# Patient Record
Sex: MALE | Race: WHITE | NOT HISPANIC OR LATINO
[De-identification: names, ages, dates, MRNs, and addresses within clinical notes are randomized per-mention and may not be internally consistent; named-entity substitution may affect disease eponyms.]

---

## 2017-01-08 ENCOUNTER — RX RENEWAL (OUTPATIENT)
Age: 57
End: 2017-01-08

## 2017-07-21 ENCOUNTER — RX RENEWAL (OUTPATIENT)
Age: 57
End: 2017-07-21

## 2020-10-08 ENCOUNTER — APPOINTMENT (OUTPATIENT)
Dept: HEART AND VASCULAR | Facility: CLINIC | Age: 60
End: 2020-10-08
Payer: COMMERCIAL

## 2020-10-08 ENCOUNTER — NON-APPOINTMENT (OUTPATIENT)
Age: 60
End: 2020-10-08

## 2020-10-08 VITALS — DIASTOLIC BLOOD PRESSURE: 86 MMHG | SYSTOLIC BLOOD PRESSURE: 136 MMHG

## 2020-10-08 VITALS
HEART RATE: 60 BPM | SYSTOLIC BLOOD PRESSURE: 132 MMHG | WEIGHT: 144 LBS | HEIGHT: 64 IN | BODY MASS INDEX: 24.59 KG/M2 | TEMPERATURE: 97.9 F | DIASTOLIC BLOOD PRESSURE: 90 MMHG

## 2020-10-08 DIAGNOSIS — I25.10 ATHEROSCLEROTIC HEART DISEASE OF NATIVE CORONARY ARTERY W/OUT ANGINA PECTORIS: ICD-10-CM

## 2020-10-08 DIAGNOSIS — R07.9 CHEST PAIN, UNSPECIFIED: ICD-10-CM

## 2020-10-08 PROCEDURE — 36415 COLL VENOUS BLD VENIPUNCTURE: CPT

## 2020-10-08 PROCEDURE — 82043 UR ALBUMIN QUANTITATIVE: CPT | Mod: QW

## 2020-10-08 PROCEDURE — 81005 URINALYSIS: CPT

## 2020-10-08 PROCEDURE — 93000 ELECTROCARDIOGRAM COMPLETE: CPT

## 2020-10-08 PROCEDURE — 99205 OFFICE O/P NEW HI 60 MIN: CPT | Mod: 25

## 2020-10-08 NOTE — HISTORY OF PRESENT ILLNESS
[FreeTextEntry1] : \par \par 61 y/o male with h/o htn, asthma, family h/o early cvd who presents for initial evaluation today\par \par \par Notes occasional chest heaviness discomfort/sob - on/off - last 6 months ago\par no sob\par no syncope, lh, edema, orthopnea, pnd, palpitations\par \par Has pulmonologist - Dr. Grover\par \par \par runs/walks 4-5 miles several times/week\par lost 25 lbs in last few months\par eating healthy\par stress level high\par \par Last cardiologist - Dr. Carlton Deleon - cardiologist/pcp for 10 yrs, last seen 2 years ago\par last imaging/holter/stress 5 yrs ago\par \par \par HTN diagnosed  in 40's\par had been on losartan in past - now on amlodipine and hctz \par \par had abnormal ekg since 30's\par \par PMH/PSH:\par htn\par allergic rhinitis\par anal fissure/fistula\par hemorrhoids\par s/p hernia repair\par s/p anal fissurectomy/fistulotomy \par asthma\par \par \par SH:\par \par live alone\par no tobacco/drugs\par social etoh\par 3 children\par \par from [place]\par \par \par MEDS:\par hctz 12.5 mg qd\par singulair 10 mg qd\par norvasc 5 mg qd\par venotlin prn\par budesonide/formoterol 2 puffs bid\par finasteride 5 mg qd\par allegra 180 mg prn\par \par \par \par ALL:\par avelox\par pcn\par \par \par FH:\par father -   78, cvd/lung disease, ppm, MI/PCI - 40's\par mother - cabg 60's, alive 80\par brother - alive, 55, htn\par sister - alive, 62, htn\par 3 children - healthy\par \par

## 2020-10-08 NOTE — DISCUSSION/SUMMARY
[Patient] : the patient [___ Month(s)] : [unfilled] month(s) [FreeTextEntry1] : \par 61 y/o male with h/o htn, asthma, family h/o early cvd who presents for initial evaluation today \par \par -ordered ekg today - nsr, normal intervals, prior IMI DENAE \par -labs ordered \par -ordered CTA given prior cp/sob\par -ordered Echo\par -consider asa, statin pending CTA \par -htn - on hctz and norvasc\par -f/up with pulm for asthma\par -counseled on cvd risk factors\par -f/up 1 month

## 2020-10-12 LAB
ALBUMIN SERPL ELPH-MCNC: 4.9 G/DL
ALP BLD-CCNC: 55 U/L
ALT SERPL-CCNC: 25 U/L
ANION GAP SERPL CALC-SCNC: 14 MMOL/L
APPEARANCE: CLEAR
AST SERPL-CCNC: 28 U/L
BACTERIA: NEGATIVE
BASOPHILS # BLD AUTO: 0.06 K/UL
BASOPHILS NFR BLD AUTO: 0.9 %
BILIRUB SERPL-MCNC: 0.7 MG/DL
BILIRUBIN URINE: NEGATIVE
BLOOD URINE: NORMAL
BUN SERPL-MCNC: 17 MG/DL
CALCIUM SERPL-MCNC: 10 MG/DL
CHLORIDE SERPL-SCNC: 99 MMOL/L
CHOLEST SERPL-MCNC: 169 MG/DL
CHOLEST/HDLC SERPL: 2.5 RATIO
CO2 SERPL-SCNC: 27 MMOL/L
COLOR: YELLOW
CREAT SERPL-MCNC: 0.97 MG/DL
CREAT SPEC-SCNC: 178 MG/DL
EOSINOPHIL # BLD AUTO: 0.21 K/UL
EOSINOPHIL NFR BLD AUTO: 3.2 %
ESTIMATED AVERAGE GLUCOSE: 100 MG/DL
GLUCOSE QUALITATIVE U: NEGATIVE
GLUCOSE SERPL-MCNC: 82 MG/DL
HBA1C MFR BLD HPLC: 5.1 %
HCT VFR BLD CALC: 48 %
HDLC SERPL-MCNC: 68 MG/DL
HGB BLD-MCNC: 15.7 G/DL
HYALINE CASTS: 1 /LPF
IMM GRANULOCYTES NFR BLD AUTO: 0.3 %
KETONES URINE: NORMAL
LDLC SERPL CALC-MCNC: 91 MG/DL
LEUKOCYTE ESTERASE URINE: NEGATIVE
LYMPHOCYTES # BLD AUTO: 1.91 K/UL
LYMPHOCYTES NFR BLD AUTO: 29.4 %
MAGNESIUM SERPL-MCNC: 2.1 MG/DL
MAN DIFF?: NORMAL
MCHC RBC-ENTMCNC: 31.4 PG
MCHC RBC-ENTMCNC: 32.7 GM/DL
MCV RBC AUTO: 96 FL
MICROALBUMIN 24H UR DL<=1MG/L-MCNC: <1.2 MG/DL
MICROALBUMIN/CREAT 24H UR-RTO: NORMAL MG/G
MICROSCOPIC-UA: NORMAL
MONOCYTES # BLD AUTO: 0.58 K/UL
MONOCYTES NFR BLD AUTO: 8.9 %
NEUTROPHILS # BLD AUTO: 3.71 K/UL
NEUTROPHILS NFR BLD AUTO: 57.3 %
NITRITE URINE: NEGATIVE
PH URINE: 5.5
PLATELET # BLD AUTO: 266 K/UL
POTASSIUM SERPL-SCNC: 4.1 MMOL/L
PROT SERPL-MCNC: 7.3 G/DL
PROTEIN URINE: NEGATIVE
RBC # BLD: 5 M/UL
RBC # FLD: 12.6 %
RED BLOOD CELLS URINE: 1 /HPF
SARS-COV-2 IGG SERPL IA-ACNC: 0.14 INDEX
SARS-COV-2 IGG SERPL QL IA: NEGATIVE
SODIUM SERPL-SCNC: 140 MMOL/L
SPECIFIC GRAVITY URINE: 1.02
SQUAMOUS EPITHELIAL CELLS: 0 /HPF
TRIGL SERPL-MCNC: 49 MG/DL
TSH SERPL-ACNC: 1.04 UIU/ML
UROBILINOGEN URINE: NORMAL
WBC # FLD AUTO: 6.49 K/UL
WHITE BLOOD CELLS URINE: 0 /HPF

## 2020-11-03 ENCOUNTER — APPOINTMENT (OUTPATIENT)
Dept: CT IMAGING | Facility: HOSPITAL | Age: 60
End: 2020-11-03

## 2020-11-03 ENCOUNTER — OUTPATIENT (OUTPATIENT)
Dept: OUTPATIENT SERVICES | Facility: HOSPITAL | Age: 60
LOS: 1 days | End: 2020-11-03
Payer: COMMERCIAL

## 2020-11-03 ENCOUNTER — RESULT REVIEW (OUTPATIENT)
Age: 60
End: 2020-11-03

## 2020-11-03 PROCEDURE — 75574 CT ANGIO HRT W/3D IMAGE: CPT | Mod: 26

## 2020-11-06 DIAGNOSIS — Q21.0 VENTRICULAR SEPTAL DEFECT: ICD-10-CM

## 2020-11-11 ENCOUNTER — FORM ENCOUNTER (OUTPATIENT)
Age: 60
End: 2020-11-11

## 2020-11-12 ENCOUNTER — OUTPATIENT (OUTPATIENT)
Dept: OUTPATIENT SERVICES | Facility: HOSPITAL | Age: 60
LOS: 1 days | End: 2020-11-12
Payer: COMMERCIAL

## 2020-11-12 DIAGNOSIS — R07.9 CHEST PAIN, UNSPECIFIED: ICD-10-CM

## 2020-11-12 PROCEDURE — 93306 TTE W/DOPPLER COMPLETE: CPT | Mod: 26

## 2021-03-01 RX ORDER — ASPIRIN ENTERIC COATED TABLETS 81 MG 81 MG/1
81 TABLET, DELAYED RELEASE ORAL DAILY
Qty: 30 | Refills: 6 | Status: ACTIVE | COMMUNITY
Start: 2020-11-06 | End: 1900-01-01

## 2021-04-11 ENCOUNTER — APPOINTMENT (OUTPATIENT)
Dept: DISASTER EMERGENCY | Facility: OTHER | Age: 61
End: 2021-04-11
Payer: COMMERCIAL

## 2021-04-11 PROCEDURE — 0002A: CPT

## 2021-06-28 ENCOUNTER — APPOINTMENT (OUTPATIENT)
Dept: HEART AND VASCULAR | Facility: CLINIC | Age: 61
End: 2021-06-28
Payer: COMMERCIAL

## 2021-06-28 ENCOUNTER — NON-APPOINTMENT (OUTPATIENT)
Age: 61
End: 2021-06-28

## 2021-06-28 VITALS
DIASTOLIC BLOOD PRESSURE: 80 MMHG | SYSTOLIC BLOOD PRESSURE: 130 MMHG | BODY MASS INDEX: 25.1 KG/M2 | TEMPERATURE: 97.6 F | HEIGHT: 64 IN | HEART RATE: 63 BPM | WEIGHT: 147 LBS

## 2021-06-28 PROCEDURE — 99214 OFFICE O/P EST MOD 30 MIN: CPT | Mod: 25

## 2021-06-28 PROCEDURE — 36415 COLL VENOUS BLD VENIPUNCTURE: CPT

## 2021-06-28 PROCEDURE — 99072 ADDL SUPL MATRL&STAF TM PHE: CPT

## 2021-06-28 PROCEDURE — 93000 ELECTROCARDIOGRAM COMPLETE: CPT

## 2021-06-28 NOTE — HISTORY OF PRESENT ILLNESS
[FreeTextEntry1] : 60 y/o male with h/o htn, asthma, family h/o early cvd, cad, dilated ao root, sinus of valsalva/right coronary cusp aneurysm who presents for f/up today\par \par \par After last visit sent for CTA and Echo, started on asa, lipitor\par notes he stopped hctz 4 months ago\par uses viagra\par sent to Dr. 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 Grover\par \par \par runs/walks 4-5 miles several times/week\par eating healthy\par stress level high\par \par Last cardiologist - Dr. Carlton Deleon - cardiologist/pcp for 10 yrs, last seen 2 years ago\par last imaging/holter/stress 5 yrs ago\par \par \par HTN diagnosed in 40's\par had been on losartan in past - now on amlodipine and hctz \par \par had abnormal ekg since 30's\par \par PMH/PSH:\par htn\par allergic rhinitis\par anal fissure/fistula\par hemorrhoids\par s/p hernia repair\par s/p anal fissurectomy/fistulotomy \par asthma\par cad\par dilated ao root\par sinus of valsalva/right coronary cusp aneurysm\par \par SH:\par \par live alone\par has girlfriend\par no tobacco/drugs\par social etoh\par 3 children\par \par from [place]\par \par \par MEDS:\par asa 81 mg qd\par lipitor 10 mg qd\par singulair 10 mg qd\par norvasc 5 mg qd\par venotlin prn\par budesonide/formoterol 2 puffs bid\par finasteride 5 mg qd\par allegra 180 mg prn\par viagra prn\par \par \par ALL:\par avelox\par pcn\par \par \par FH:\par father -  78, cvd/lung disease, ppm, MI/PCI - 40's\par mother - cabg 60's, alive 80\par brother - alive, 55, htn\par sister - alive, 62, htn\par 3 children - healthy\par \par

## 2021-06-28 NOTE — DISCUSSION/SUMMARY
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 inferoseptal wall recommend correlation w echo to exclude RV communication, focal aneurysm sinus of valsalva RCC (8mm neck x 4.8 mm length)\par -Echo 11/20: normal lv size/function, ef 55%, no wma, trace mr/tr/pr, ao root dilated 4.10 cm\par -ordered ekg today - nsr, normal intervals, prior IMI DENAE \par -labs 2020 reviewed\par -continue asa, statin\par -htn - on norvasc, start toprol 12.5 mg qd given dilated ao root\par -ordered lipids today\par -f/up with pulm for asthma\par -counseled on cvd risk factors\par -f/up 3 weeks for bp\par \par I have spent 30 minutes reviewing labs, records, tests and discussing bp control/meds, sinus of valsalva aneurysm/dilated ao root evaluation.

## 2021-06-30 ENCOUNTER — APPOINTMENT (OUTPATIENT)
Dept: CARDIOTHORACIC SURGERY | Facility: CLINIC | Age: 61
End: 2021-06-30
Payer: COMMERCIAL

## 2021-06-30 VITALS
HEIGHT: 64 IN | SYSTOLIC BLOOD PRESSURE: 145 MMHG | HEART RATE: 68 BPM | RESPIRATION RATE: 17 BRPM | BODY MASS INDEX: 24.92 KG/M2 | TEMPERATURE: 97.9 F | DIASTOLIC BLOOD PRESSURE: 81 MMHG | OXYGEN SATURATION: 95 % | WEIGHT: 146 LBS

## 2021-06-30 DIAGNOSIS — Z82.49 FAMILY HISTORY OF ISCHEMIC HEART DISEASE AND OTHER DISEASES OF THE CIRCULATORY SYSTEM: ICD-10-CM

## 2021-06-30 LAB
CHOLEST SERPL-MCNC: 121 MG/DL
HDLC SERPL-MCNC: 68 MG/DL
LDLC SERPL CALC-MCNC: 39 MG/DL
NONHDLC SERPL-MCNC: 53 MG/DL
TRIGL SERPL-MCNC: 72 MG/DL

## 2021-06-30 PROCEDURE — 99072 ADDL SUPL MATRL&STAF TM PHE: CPT

## 2021-06-30 PROCEDURE — 99204 OFFICE O/P NEW MOD 45 MIN: CPT

## 2021-07-08 ENCOUNTER — APPOINTMENT (OUTPATIENT)
Dept: CARDIOLOGY | Facility: CLINIC | Age: 61
End: 2021-07-08
Payer: COMMERCIAL

## 2021-07-08 VITALS
HEART RATE: 68 BPM | BODY MASS INDEX: 25.06 KG/M2 | WEIGHT: 146 LBS | SYSTOLIC BLOOD PRESSURE: 120 MMHG | DIASTOLIC BLOOD PRESSURE: 80 MMHG | OXYGEN SATURATION: 96 %

## 2021-07-08 DIAGNOSIS — I10 ESSENTIAL (PRIMARY) HYPERTENSION: ICD-10-CM

## 2021-07-08 DIAGNOSIS — I77.810 THORACIC AORTIC ECTASIA: ICD-10-CM

## 2021-07-08 PROCEDURE — 99072 ADDL SUPL MATRL&STAF TM PHE: CPT

## 2021-07-08 PROCEDURE — 99204 OFFICE O/P NEW MOD 45 MIN: CPT

## 2021-07-08 RX ORDER — KRILL/OM-3/DHA/EPA/PHOSPHO/AST 1000-230MG
81 CAPSULE ORAL DAILY
Qty: 90 | Refills: 3 | Status: DISCONTINUED | COMMUNITY
Start: 2020-11-06 | End: 2021-07-08

## 2021-07-08 RX ORDER — ATORVASTATIN CALCIUM 10 MG/1
10 TABLET, FILM COATED ORAL
Qty: 90 | Refills: 1 | Status: COMPLETED | COMMUNITY
Start: 2020-11-06 | End: 2021-07-08

## 2021-07-08 RX ORDER — HYDROCHLOROTHIAZIDE 12.5 MG/1
12.5 TABLET ORAL
Refills: 0 | Status: DISCONTINUED | COMMUNITY
End: 2021-07-08

## 2021-07-08 NOTE — HISTORY OF PRESENT ILLNESS
[FreeTextEntry1] : In brief, Carlton is a healthy 61-year-old with a history of some mild hypertension and hyperlipidemia.  He has been told in the past of some abnormality of his aorta.  He is asymptomatic with excellent exercise tolerance, no chest pain no shortness of breath.  He has lost weight by effort and feels quite well.  He runs miles every day without any difficulty.  He has some history of mild asthma\par \par He denies chest pain chest pressure back pain or shortness of breath.  He denies palpitations\par \par He recently saw her cardiologist and had both an echocardiogram and CT coronary angiogram.\par \par CT coronary angiogram revealed the following: The heart was normal in size the aortic root measured 4.2 cm there was a 0.8 cm focal outpouching from the right coronary cusp of the sinus of Valsalva distant from the origin of the right coronary artery ascending aorta measured 3.6 cm with distal tapering\par Coronary calcium score was 135 with mild coronary disease with left main less than 20% LAD luminal circumflex mild stenosis and right coronary artery minimal stenosis there was no pericardial effusion\par \par Echocardiogram revealed normal LV and RV function no valvular disease dilated aortic root asending of 4.1 cm\par Lipid panel June 2021 HDL 68 LDL 39 non-HDL 53 triglycerides 72\par \par The patient saw Dr. Nikolay Munson who is evaluating him for possible aortic repair because of the pseudoaneurysm at the right coronary cusp.\par \par He again remains asymptomatic\par

## 2021-07-08 NOTE — ASSESSMENT
[FreeTextEntry1] : Carlton Katz is asymptomatic 61-year-old with some mild hypertension which is well controlled and hyperlipidemia under excellent control.  He has a dilated aortic root of 4.1 cm but does have an outpouching of the sinus of Valsalva  along the right coronary cusp possible pseudoaneurysm.  He is asymptomatic.\par \par We do not have any prior imaging available at this time and he is unaware whether he had a where he had previous CTs of the chest

## 2021-07-08 NOTE — DISCUSSION/SUMMARY
[FreeTextEntry1] : 1.  I will discuss with Dr. Munson his thinking concerning the aortic aneurysm which does not meet criteria for surgery (4.1 cm) and the possible pseudoaneurysm of the right sinus of Valsalva.\par \par 2 presently the patient is asymptomatic with excellent exercise tolerance without evidence of any significant coronary disease on appropriate medications with a well-controlled blood pressure\par \par For now I think we continue continue to manage him medically and perhaps consider further imaging prior to considering more invasive approach

## 2021-07-08 NOTE — REASON FOR VISIT
[FreeTextEntry1] : Carlton Katz 61 years old was seen in cardiac consultation for evaluation of his aortic aneurysm and possible pseudoaneurysm of the sinus of Valsalva.  I had seen him over 7 to 8 years ago but the results of my evaluation are not available to me or to the patient

## 2021-07-08 NOTE — PHYSICAL EXAM
[Well Developed] : well developed [Well Nourished] : well nourished [Normal Conjunctiva] : normal conjunctiva [No Carotid Bruit] : no carotid bruit [Normal S1, S2] : normal S1, S2 [No Murmur] : no murmur [Clear Lung Fields] : clear lung fields [No Respiratory Distress] : no respiratory distress  [Soft] : abdomen soft [Non Tender] : non-tender [Normal Gait] : normal gait [No Edema] : no edema [Normal DP B/L] : normal DP B/L [Moves all extremities] : moves all extremities [de-identified] : P2 normal no diastolic murmur

## 2021-07-12 ENCOUNTER — APPOINTMENT (OUTPATIENT)
Dept: CARDIOTHORACIC SURGERY | Facility: CLINIC | Age: 61
End: 2021-07-12
Payer: COMMERCIAL

## 2021-07-12 DIAGNOSIS — Q25.49 OTHER CONGENITAL MALFORMATIONS OF AORTA: ICD-10-CM

## 2021-07-12 PROCEDURE — 99443: CPT

## 2021-07-13 PROBLEM — Q25.49 SINUS OF VALSALVA ANEURYSM: Status: ACTIVE | Noted: 2020-11-06

## 2021-07-13 NOTE — REASON FOR VISIT
[Follow-Up: _____] : a [unfilled] follow-up visit [Home] : at home, [unfilled] , at the time of the visit. [Medical Office: ([facility])___] : at the medical office located in  [Verbal consent obtained from patient] : the patient, [unfilled]

## 2021-07-16 NOTE — HISTORY OF PRESENT ILLNESS
[FreeTextEntry1] : 61 year old male with a past medical hx of HTN, asthma, CAD, presents for evaluation and management of aortic pathology (aortic root aneurysm, pseudoaneurysm of sinus of valsalva). Patient is under the care of Dr. Ruth Ann Springer. Patient was evaluated in the office on 6/30/21 and returns today for discussion on surgical intervention. \par \par CTA coronaries 11/6/20:\par Aortic root measures 4.2 cm.\par 0.7 cm focal wide based bulge/aneurysm arising from the right coronary cusp of the sinus of Valsalva.\par \par TTE 11/12/20: aortic root 4.1cm. no significant valvular disease. \par \par Patient is doing well and denies recent hospitalization, ER visits, or surgeries. He reports burning sensation to chest s/t asthma which is alleviated by inhalers. He denies fever, chills, fatigue, headache, blurred vision, dizziness, syncope, acute chest pain, palpitations, shortness of breath, orthopnea, paroxysmal nocturnal dyspnea, nausea, vomiting, abdominal pain, back pain, BRBPR or swelling to legs. \par \par **patient has been fully vaccinated against covid -- last dose 4/11/21

## 2021-07-16 NOTE — DATA REVIEWED
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

## 2021-07-16 NOTE — ASSESSMENT
[FreeTextEntry1] : 61 year old male with a past medical hx of HTN, asthma, CAD, presents for evaluation and management of aortic pathology (aortic root aneurysm, pseudoaneurysm of sinus of valsalva).\par \par I have reviewed the patient's medical records, diagnostic images during the time of this office consultation and have made the following recommendation. I have reviewed the indications for surgery, and used our webpage [email or web address] <[email or web address]> to illustrate the aorta and anatomy of the heart. I have discussed the indications for surgery with the patient. \par \par I had a lengthy discussion with the patient regarding his aneurysmal disease and progression. I have recommended that the patient is a candidate for a valve sparing aortic root replacement. \par \par I have discussed the risks, benefits and alternatives to surgery. I have explained the risks of the surgery, including approximately 1% major mortality or morbidity including stroke, infection, bleeding, death, renal failure and heart attack. All questions were addressed and patient agrees to proceed with surgery. \par \par -The patient is a candidate for a valve sparing aortic root replacement. Same day admission 8/2/21. \par -patient will require a cardiac cath prior to surgery. PST - carotid doppler, chest xray, labs, EKG. Cath to be scheduled for 7/26/21. \par -continue current medication regimen.\par

## 2021-07-16 NOTE — END OF VISIT
[Time Spent: ___ minutes] : I have spent [unfilled] minutes of time on the encounter. [FreeTextEntry3] : \par I, STEPHEN SCHILLINGU , am scribing for and in the presence of LORETO RUSH the following sections: History of present illness, past Medical/family/surgical/family/social history, review of systems,  and disposition.\par \par I personally performed the services described in the documentation, reviewed the documentation recorded by the scribe in my presence and it accurately and completely records my words and actions.\par

## 2021-07-20 VITALS
RESPIRATION RATE: 16 BRPM | OXYGEN SATURATION: 99 % | HEIGHT: 66 IN | WEIGHT: 143.08 LBS | HEART RATE: 60 BPM | DIASTOLIC BLOOD PRESSURE: 74 MMHG | TEMPERATURE: 98 F | SYSTOLIC BLOOD PRESSURE: 142 MMHG

## 2021-07-20 RX ORDER — CHLORHEXIDINE GLUCONATE 213 G/1000ML
1 SOLUTION TOPICAL ONCE
Refills: 0 | Status: DISCONTINUED | OUTPATIENT
Start: 2021-07-26 | End: 2021-08-09

## 2021-07-20 RX ORDER — BUDESONIDE AND FORMOTEROL FUMARATE DIHYDRATE 160; 4.5 UG/1; UG/1
160-4.5 AEROSOL RESPIRATORY (INHALATION) TWICE DAILY
Refills: 0 | Status: ACTIVE | COMMUNITY
Start: 2021-07-20

## 2021-07-20 RX ORDER — SILDENAFIL 50 MG/1
50 TABLET ORAL
Refills: 0 | Status: ACTIVE | COMMUNITY
Start: 2021-07-20

## 2021-07-20 NOTE — H&P ADULT - NSHPSOCIALHISTORY_GEN_ALL_CORE
Denies tobacco or illicit drug use  Admits to social ETOH use  Works as   , has girlfriend, lives alone, has 3 children Patient admits to social ETOH use. Patient denies tobacco or illicit drug use.  Patient works as a , , and has a girlfriend. Patient states he lives alone.

## 2021-07-20 NOTE — H&P ADULT - NSHPLABSRESULTS_GEN_ALL_CORE
CBC Full  -  ( 26 Jul 2021 08:06 )  WBC Count : 6.13 K/uL  RBC Count : 5.26 M/uL  Hemoglobin : 16.1 g/dL  Hematocrit : 48.5 %  Platelet Count - Automated : 192 K/uL  Mean Cell Volume : 92.2 fl  Mean Cell Hemoglobin : 30.6 pg  Mean Cell Hemoglobin Concentration : 33.2 gm/dL  Auto Neutrophil # : 3.67 K/uL  Auto Lymphocyte # : 1.55 K/uL  Auto Monocyte # : 0.57 K/uL  Auto Eosinophil # : 0.28 K/uL  Auto Basophil # : 0.04 K/uL  Auto Neutrophil % : 59.8 %  Auto Lymphocyte % : 25.3 %  Auto Monocyte % : 9.3 %  Auto Eosinophil % : 4.6 %  Auto Basophil % : 0.7 %    07-26    141  |  100  |  14  ----------------------------<  87  3.8   |  31  |  0.88    Ca    10.2      26 Jul 2021 08:06    TPro  8.0  /  Alb  5.0  /  TBili  0.6  /  DBili  x   /  AST  26  /  ALT  25  /  AlkPhos  61  07-26

## 2021-07-20 NOTE — H&P ADULT - ASSESSMENT
62 y/o active male w/ strong FHx CAD (father w/ MI/PCIs, mother w/ CABG) and PMHx HTN, HLD, aortic pathology (4.2cm aortic root aneurysm, 0.7cm pseudoaneurysm of sinus of Valsava), BPH, and Asthma who presents to [facility] for cardiac catheterization as part of workup for planned valve sparing aortic root replacement w/ Dr Munson scheduled for 08/02/2021.     EKG on arrival showed sinus bradycardia at _____ w/ evidence of left axis deviation. Labs on arrival within normal limits. Patient reports compliance w/ Aspirin 81 mg daily, last dose taken 07/26/2021 AM prior to arrival to the hospital. Patient was ordered for NS 75 cc/hour x 4 hours.   				  ASA: III		Mallampati class: III	            Anginal Class: Asymptomatic     Sedation Plan: Moderate   Patient Is Suitable Candidate For Sedation: Yes     Risks & benefits of procedure and sedation and risks and benefits for the alternative therapy have been explained to the patient in layman’s terms including but not limited to: allergic reaction, bleeding, infection, arrhythmia, respiratory compromise, renal and vascular compromise, limb damage, MI, CVA, emergent CABG/Vascular Surgery and death. Informed consent obtained and in chart. 60 y/o active male w/ strong FHx CAD (father w/ MI/PCIs, mother w/ CABG) and PMHx HTN, HLD, aortic pathology (4.2cm aortic root aneurysm, 0.7cm pseudoaneurysm of sinus of Valsava), BPH, and Asthma who presents to [facility] for cardiac catheterization as part of workup for planned valve sparing aortic root replacement w/ Dr Munson scheduled for 08/02/2021.     EKG on arrival showed sinus bradycardia at 59 bpm w/ evidence of left axis deviation. Labs on arrival within normal limits. Patient reports compliance w/ Aspirin 81 mg daily, last dose taken 07/26/2021 AM prior to arrival to the hospital. Patient was ordered for NS 75 cc/hour x 4 hours.   				  ASA: III		Mallampati class: III	            Anginal Class: Asymptomatic     Sedation Plan: Moderate   Patient Is Suitable Candidate For Sedation: Yes     Risks & benefits of procedure and sedation and risks and benefits for the alternative therapy have been explained to the patient in layman’s terms including but not limited to: allergic reaction, bleeding, infection, arrhythmia, respiratory compromise, renal and vascular compromise, limb damage, MI, CVA, emergent CABG/Vascular Surgery and death. Informed consent obtained and in chart.

## 2021-07-20 NOTE — H&P ADULT - REASON FOR ADMISSION
Diagnostic Cardiac Catheterization for Valve Sparing Aortic Root Replacement Cardiac Catheterization, Pre-Op

## 2021-07-20 NOTE — H&P ADULT - NSICDXPASTMEDICALHX_GEN_ALL_CORE_FT
PAST MEDICAL HISTORY:  Aortic root aneurysm     Asthma     BPH (benign prostatic hyperplasia)     HLD (hyperlipidemia)     HTN (hypertension)

## 2021-07-20 NOTE — H&P ADULT - HISTORY OF PRESENT ILLNESS
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 Pt states he is normally quite active without CP, SOB, palpitations, dizziness, or syncope, but has noticed a worsening cough with thick, light yellow sputum for the last 2 months which he attributes to asthma and allergies. He states he was seen by his PCP about 1-2 weeks ago and was prescribed a 5 day course of Prednisone and Abx which he states has greatly improved his cough, but has not resolved it. He also denies HA, orthopnea, PND, n/v/d, abdominal pain, melena, BRBPR, urinary symptoms, LE edema, recent travel, sick contacts, or any other symptoms at this time.     CCTA on 11/3/20: aortic root measures 4.2cm, 0.7 cm focal wide based bulge/aneurysm arising from the right coronary cusp of the sinus of Valsalva, deep cleft along the left side of the interventricular septum, Calcium score 135 (75th percentile), non-obstructive CAD, deep myocardial crypt in the mid inferoseptal wall, recommend correlation with echo to exclude RV communication, focal aneurysm of sinus of Valsalva involving the RCC (approximately 8 mm (neck) x 4.8 mm(length)  Echo on 11/12/20: dilated aortic root measuring 4.1cm at the sinuses of Valsalva and EF 55%, no WMA, normal diastolic function, trace MR/TR/OK. Of note, Echo makes no mention of RV communication as was there was concern for this on CCTA.    In light of pt's risk factors and plan for aortic root replacement, pt now presents for diagnostic cardiac cath as part of pre-op workup.   Cardiologist: Dr. Munson, Dr. Springer, Dr. 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 Patient states he is normally quite active w/o chest pain, SOB/EDWARD, palpitations, dizziness, or syncope, but has noticed a worsening cough w/ thick, light yellow sputum for the last 2 months which he attributes to asthma and allergies and recent completed a course of Prednisone and antibiotics. Patient reports that these symptoms have improved at this time. Patient also denies any headache, abdominal pain, nausea/vomiting/diarrhea, melena, LE edema, fever, chills, and known sick contacts. 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 Echocardiogram (11/12/2020) showed dilated aortic root measuring 4.1 cm at the sinuses of Valsalva and EF 55%, no wall motion abnormalities, normal diastolic function, trace MR/TR/GA, and no mention of RV communication (as was there was concern for this on CCTA).    In light of patient's risk factors and plan for aortic root replacement, patient now presents for cardiac catheterization w/ possible intervention if clinically indicated as part of pre-op workup.

## 2021-07-26 ENCOUNTER — APPOINTMENT (OUTPATIENT)
Dept: HEART AND VASCULAR | Facility: CLINIC | Age: 61
End: 2021-07-26

## 2021-07-26 ENCOUNTER — OUTPATIENT (OUTPATIENT)
Dept: OUTPATIENT SERVICES | Facility: HOSPITAL | Age: 61
LOS: 1 days | Discharge: HOME | End: 2021-07-26
Payer: COMMERCIAL

## 2021-07-26 DIAGNOSIS — Z98.890 OTHER SPECIFIED POSTPROCEDURAL STATES: Chronic | ICD-10-CM

## 2021-07-26 LAB
A1C WITH ESTIMATED AVERAGE GLUCOSE RESULT: 5.3 % — SIGNIFICANT CHANGE UP (ref 4–5.6)
ALBUMIN SERPL ELPH-MCNC: 5 G/DL — SIGNIFICANT CHANGE UP (ref 3.3–5)
ALP SERPL-CCNC: 61 U/L — SIGNIFICANT CHANGE UP (ref 40–120)
ALT FLD-CCNC: 25 U/L — SIGNIFICANT CHANGE UP (ref 10–45)
ANION GAP SERPL CALC-SCNC: 10 MMOL/L — SIGNIFICANT CHANGE UP (ref 5–17)
APPEARANCE UR: CLEAR — SIGNIFICANT CHANGE UP
APTT BLD: 33.3 SEC — SIGNIFICANT CHANGE UP (ref 27.5–35.5)
AST SERPL-CCNC: 26 U/L — SIGNIFICANT CHANGE UP (ref 10–40)
BACTERIA # UR AUTO: SIGNIFICANT CHANGE UP /HPF
BASOPHILS # BLD AUTO: 0.04 K/UL — SIGNIFICANT CHANGE UP (ref 0–0.2)
BASOPHILS NFR BLD AUTO: 0.7 % — SIGNIFICANT CHANGE UP (ref 0–2)
BILIRUB SERPL-MCNC: 0.6 MG/DL — SIGNIFICANT CHANGE UP (ref 0.2–1.2)
BILIRUB UR-MCNC: NEGATIVE — SIGNIFICANT CHANGE UP
BLD GP AB SCN SERPL QL: NEGATIVE — SIGNIFICANT CHANGE UP
BUN SERPL-MCNC: 14 MG/DL — SIGNIFICANT CHANGE UP (ref 7–23)
CALCIUM SERPL-MCNC: 10.2 MG/DL — SIGNIFICANT CHANGE UP (ref 8.4–10.5)
CHLORIDE SERPL-SCNC: 100 MMOL/L — SIGNIFICANT CHANGE UP (ref 96–108)
CHOLEST SERPL-MCNC: 133 MG/DL — SIGNIFICANT CHANGE UP
CK MB CFR SERPL CALC: 3.5 NG/ML — SIGNIFICANT CHANGE UP (ref 0–6.7)
CK SERPL-CCNC: 138 U/L — SIGNIFICANT CHANGE UP (ref 30–200)
CO2 SERPL-SCNC: 31 MMOL/L — SIGNIFICANT CHANGE UP (ref 22–31)
COLOR SPEC: YELLOW — SIGNIFICANT CHANGE UP
CREAT SERPL-MCNC: 0.88 MG/DL — SIGNIFICANT CHANGE UP (ref 0.5–1.3)
DIFF PNL FLD: ABNORMAL
EOSINOPHIL # BLD AUTO: 0.28 K/UL — SIGNIFICANT CHANGE UP (ref 0–0.5)
EOSINOPHIL NFR BLD AUTO: 4.6 % — SIGNIFICANT CHANGE UP (ref 0–6)
EPI CELLS # UR: SIGNIFICANT CHANGE UP /HPF (ref 0–5)
ESTIMATED AVERAGE GLUCOSE: 105 MG/DL — SIGNIFICANT CHANGE UP (ref 68–114)
GLUCOSE SERPL-MCNC: 87 MG/DL — SIGNIFICANT CHANGE UP (ref 70–99)
GLUCOSE UR QL: NEGATIVE — SIGNIFICANT CHANGE UP
HBV SURFACE AG SER-ACNC: SIGNIFICANT CHANGE UP
HCT VFR BLD CALC: 48.5 % — SIGNIFICANT CHANGE UP (ref 39–50)
HDLC SERPL-MCNC: 88 MG/DL — SIGNIFICANT CHANGE UP
HGB BLD-MCNC: 16.1 G/DL — SIGNIFICANT CHANGE UP (ref 13–17)
IMM GRANULOCYTES NFR BLD AUTO: 0.3 % — SIGNIFICANT CHANGE UP (ref 0–1.5)
INR BLD: 0.89 — SIGNIFICANT CHANGE UP (ref 0.88–1.16)
KETONES UR-MCNC: NEGATIVE — SIGNIFICANT CHANGE UP
LEUKOCYTE ESTERASE UR-ACNC: NEGATIVE — SIGNIFICANT CHANGE UP
LIPID PNL WITH DIRECT LDL SERPL: 33 MG/DL — SIGNIFICANT CHANGE UP
LYMPHOCYTES # BLD AUTO: 1.55 K/UL — SIGNIFICANT CHANGE UP (ref 1–3.3)
LYMPHOCYTES # BLD AUTO: 25.3 % — SIGNIFICANT CHANGE UP (ref 13–44)
MCHC RBC-ENTMCNC: 30.6 PG — SIGNIFICANT CHANGE UP (ref 27–34)
MCHC RBC-ENTMCNC: 33.2 GM/DL — SIGNIFICANT CHANGE UP (ref 32–36)
MCV RBC AUTO: 92.2 FL — SIGNIFICANT CHANGE UP (ref 80–100)
MONOCYTES # BLD AUTO: 0.57 K/UL — SIGNIFICANT CHANGE UP (ref 0–0.9)
MONOCYTES NFR BLD AUTO: 9.3 % — SIGNIFICANT CHANGE UP (ref 2–14)
NEUTROPHILS # BLD AUTO: 3.67 K/UL — SIGNIFICANT CHANGE UP (ref 1.8–7.4)
NEUTROPHILS NFR BLD AUTO: 59.8 % — SIGNIFICANT CHANGE UP (ref 43–77)
NITRITE UR-MCNC: NEGATIVE — SIGNIFICANT CHANGE UP
NON HDL CHOLESTEROL: 45 MG/DL — SIGNIFICANT CHANGE UP
NRBC # BLD: 0 /100 WBCS — SIGNIFICANT CHANGE UP (ref 0–0)
PH UR: 6.5 — SIGNIFICANT CHANGE UP (ref 5–8)
PLATELET # BLD AUTO: 192 K/UL — SIGNIFICANT CHANGE UP (ref 150–400)
POTASSIUM SERPL-MCNC: 3.8 MMOL/L — SIGNIFICANT CHANGE UP (ref 3.5–5.3)
POTASSIUM SERPL-SCNC: 3.8 MMOL/L — SIGNIFICANT CHANGE UP (ref 3.5–5.3)
PROT SERPL-MCNC: 8 G/DL — SIGNIFICANT CHANGE UP (ref 6–8.3)
PROT UR-MCNC: NEGATIVE MG/DL — SIGNIFICANT CHANGE UP
PROTHROM AB SERPL-ACNC: 10.7 SEC — SIGNIFICANT CHANGE UP (ref 10.6–13.6)
RBC # BLD: 5.26 M/UL — SIGNIFICANT CHANGE UP (ref 4.2–5.8)
RBC # FLD: 12.3 % — SIGNIFICANT CHANGE UP (ref 10.3–14.5)
RBC CASTS # UR COMP ASSIST: < 5 /HPF — SIGNIFICANT CHANGE UP
RH IG SCN BLD-IMP: POSITIVE — SIGNIFICANT CHANGE UP
SODIUM SERPL-SCNC: 141 MMOL/L — SIGNIFICANT CHANGE UP (ref 135–145)
SP GR SPEC: 1.01 — SIGNIFICANT CHANGE UP (ref 1–1.03)
TRIGL SERPL-MCNC: 61 MG/DL — SIGNIFICANT CHANGE UP
TSH SERPL-MCNC: 2.15 UIU/ML — SIGNIFICANT CHANGE UP (ref 0.27–4.2)
UROBILINOGEN FLD QL: 0.2 E.U./DL — SIGNIFICANT CHANGE UP
WBC # BLD: 6.13 K/UL — SIGNIFICANT CHANGE UP (ref 3.8–10.5)
WBC # FLD AUTO: 6.13 K/UL — SIGNIFICANT CHANGE UP (ref 3.8–10.5)
WBC UR QL: < 5 /HPF — SIGNIFICANT CHANGE UP

## 2021-07-26 PROCEDURE — 71045 X-RAY EXAM CHEST 1 VIEW: CPT

## 2021-07-26 PROCEDURE — 84443 ASSAY THYROID STIM HORMONE: CPT

## 2021-07-26 PROCEDURE — 80061 LIPID PANEL: CPT

## 2021-07-26 PROCEDURE — 83036 HEMOGLOBIN GLYCOSYLATED A1C: CPT

## 2021-07-26 PROCEDURE — 93010 ELECTROCARDIOGRAM REPORT: CPT

## 2021-07-26 PROCEDURE — 86850 RBC ANTIBODY SCREEN: CPT

## 2021-07-26 PROCEDURE — 86900 BLOOD TYPING SEROLOGIC ABO: CPT

## 2021-07-26 PROCEDURE — 93458 L HRT ARTERY/VENTRICLE ANGIO: CPT | Mod: 26

## 2021-07-26 PROCEDURE — 71045 X-RAY EXAM CHEST 1 VIEW: CPT | Mod: 26

## 2021-07-26 PROCEDURE — 85610 PROTHROMBIN TIME: CPT

## 2021-07-26 PROCEDURE — 85730 THROMBOPLASTIN TIME PARTIAL: CPT

## 2021-07-26 PROCEDURE — C1887: CPT

## 2021-07-26 PROCEDURE — 93880 EXTRACRANIAL BILAT STUDY: CPT | Mod: 26

## 2021-07-26 PROCEDURE — 93880 EXTRACRANIAL BILAT STUDY: CPT

## 2021-07-26 PROCEDURE — 82550 ASSAY OF CK (CPK): CPT

## 2021-07-26 PROCEDURE — 86901 BLOOD TYPING SEROLOGIC RH(D): CPT

## 2021-07-26 PROCEDURE — 94010 BREATHING CAPACITY TEST: CPT | Mod: 26

## 2021-07-26 PROCEDURE — 93005 ELECTROCARDIOGRAM TRACING: CPT

## 2021-07-26 PROCEDURE — 87340 HEPATITIS B SURFACE AG IA: CPT

## 2021-07-26 PROCEDURE — 80053 COMPREHEN METABOLIC PANEL: CPT

## 2021-07-26 PROCEDURE — C1894: CPT

## 2021-07-26 PROCEDURE — 93458 L HRT ARTERY/VENTRICLE ANGIO: CPT

## 2021-07-26 PROCEDURE — C1769: CPT

## 2021-07-26 PROCEDURE — 94150 VITAL CAPACITY TEST: CPT

## 2021-07-26 PROCEDURE — 81001 URINALYSIS AUTO W/SCOPE: CPT

## 2021-07-26 PROCEDURE — 99213 OFFICE O/P EST LOW 20 MIN: CPT

## 2021-07-26 PROCEDURE — 85025 COMPLETE CBC W/AUTO DIFF WBC: CPT

## 2021-07-26 PROCEDURE — 82553 CREATINE MB FRACTION: CPT

## 2021-07-26 RX ORDER — SODIUM CHLORIDE 9 MG/ML
500 INJECTION INTRAMUSCULAR; INTRAVENOUS; SUBCUTANEOUS
Refills: 0 | Status: DISCONTINUED | OUTPATIENT
Start: 2021-07-26 | End: 2021-07-26

## 2021-07-26 RX ORDER — SODIUM CHLORIDE 9 MG/ML
500 INJECTION INTRAMUSCULAR; INTRAVENOUS; SUBCUTANEOUS
Refills: 0 | Status: DISCONTINUED | OUTPATIENT
Start: 2021-07-26 | End: 2021-08-09

## 2021-07-26 RX ORDER — POTASSIUM CHLORIDE 20 MEQ
30 PACKET (EA) ORAL ONCE
Refills: 0 | Status: COMPLETED | OUTPATIENT
Start: 2021-07-26 | End: 2021-07-26

## 2021-07-26 RX ADMIN — SODIUM CHLORIDE 75 MILLILITER(S): 9 INJECTION INTRAMUSCULAR; INTRAVENOUS; SUBCUTANEOUS at 11:41

## 2021-07-26 RX ADMIN — Medication 30 MILLIEQUIVALENT(S): at 09:07

## 2021-07-26 RX ADMIN — SODIUM CHLORIDE 75 MILLILITER(S): 9 INJECTION INTRAMUSCULAR; INTRAVENOUS; SUBCUTANEOUS at 08:43

## 2021-07-26 NOTE — CONSULT NOTE ADULT - SUBJECTIVE AND OBJECTIVE BOX
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 for cardiac catheterization as part of workup for planned valve sparing aortic root replacement w/ Dr Munson scheduled for 08/02/2021. 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 Patient is now s/p cardiac cath (7/26/21): LM normal, LAD minor irregularities, LCx minor irregularities, RCA normal; LVEF 55%, LVEDp 13mmHg.    Review of systems otherwise negative.     Lifestyle History:  Mediterranean Diet Score (9 question survey) was 5.   (8-9: optimal, 6-7: near-optimal, 4-5: suboptimal, 0-3: markedly suboptimal)  Exercise: Patient reports exercising at a moderate level for >150 minutes per week.   Smoking: Patient denies any history of smoking.   Stress: Patient denies any stress.     PAST MEDICAL & SURGICAL HISTORY:  HTN (hypertension)    HLD (hyperlipidemia)    Aortic root aneurysm    Asthma    BPH (benign prostatic hyperplasia)    H/O hernia repair    S/P anal fissurectomy      FAMILY HISTORY:   CAD (father w/ MI/PCIs; mother w/ CABG)    Allergies:   Avelox (Unknown)  penicillins (Anaphylaxis; Angioedema)      HOME MEDICATIONS:   albuterol 90 mcg/inh inhalation aerosol: 2 puff(s) inhaled , As Needed (26 Jul 2021 08:49)  amLODIPine 5 mg oral tablet: 1 tab(s) orally once a day (26 Jul 2021 08:49)  Aspirin Enteric Coated 81 mg oral delayed release tablet: 1 tab(s) orally once a day (26 Jul 2021 08:49)  atorvastatin 10 mg oral tablet: 1 tab(s) orally once a day (26 Jul 2021 08:49)  budesonide-formoterol 160 mcg-4.5 mcg/inh inhalation aerosol: 2 puff(s) inhaled 2 times a day (26 Jul 2021 08:49)  finasteride 5 mg oral tablet: 1 tab(s) orally once a day (26 Jul 2021 08:49)  metoprolol succinate 25 mg oral tablet, extended release: 0.5 tab(s) orally once a day (26 Jul 2021 08:49)  sildenafil 50 mg oral tablet: 1 tab(s) orally, few times per week as needed (26 Jul 2021 08:49)  Singulair 10 mg oral tablet: 1 tab(s) orally once a day (26 Jul 2021 08:49)      PHYSICAL EXAM:  · Temp (F)	97.5 Degrees F  · Temp (C)	36.4 Degrees C  · Heart Rate	60 /min  · Respiration Rate (breaths/min)	16 /min  · BP Systolic	142 mm Hg  · BP Diastolic	74 mm Hg  · Blood Pressure - Method	electronic  · BP Noninvasive Mean	96 mm Hg  · SpO2 (%)	99 %  · O2 Delivery/Oxygen Delivery Method	room air    Height (cm): 167.6 (07-26 @ 08:59)  Weight (kg): 64.9 (07-26 @ 08:59)  BMI (kg/m2): 23.1 (07-26 @ 08:59)  	  Gen- awake, conversive  Head-NCAT; eyes: no corneal arcus noted b/l; no xanthelasmas   Neck- no JVD, no carotid bruit b/l  Respiratory- respirations non-labored; clear to auscultation b/l   Cardiovascular- S1S2, RRR, no murmur  Neurology- alert and oriented x 3, no focal deficits  Psych- normal affect; appearance, verbalizations, behaviors are appropriate   Skin- no lesions, no rashes, no xanthomas     LABS:	                        16.1   6.13  )-----------( 192      ( 26 Jul 2021 08:06 )             48.5     07-26    141  |  100  |  14  ----------------------------<  87  3.8   |  31  |  0.88    Ca    10.2      26 Jul 2021 08:06    TPro  8.0  /  Alb  5.0  /  TBili  0.6  /  DBili  x   /  AST  26  /  ALT  25  /  AlkPhos  61  07-26      Thyroid Stimulating Hormone, Serum: 2.150 uIU/mL (07-26 @ 08:06)    Cholesterol, Serum: 133 mg/dL (07-26 @ 08:06)  HDL Cholesterol, Serum: 88 mg/dL (07-26 @ 08:06)  Triglycerides, Serum: 61 mg/dL (07-26 @ 08:06)  LDL Cholesterol, Calculated: 33 mg/dL (07-26 @ 08:06)  HbA1c: 5.3% (07-26 @ 08:06)    ASSESSMENT/RECOMMENDATIONS: 	  Patient's dietary, exercise and overall lifestyle habits were reviewed. The concept of atherosclerosis and its systemic nature was discussed with a focus on the need to get all cardiovascular risk factors to goal. At this time, I would like to make the following recommendations to optimize atherosclerotic risk factors.     RECOMMENDATIONS:   Anti-platelet Therapy: APT per interventionalist recommendation.   Lipid Therapy: Patient is currently taking atorvastatin 10mg and is compliant and tolerating it well. Although his current LDL-C is at goal, it would be reasonable to consider increasing the statin dosage for further risk reduction in the future. We would also recommend checking the Lipoprotein A level (Lpa) to assess for a possible genetic component. If elevated, we advise the first degree relatives - siblings and children - also be checked, as it is a strong risk factor for ASCVD.   Hypertension: Blood pressures during this stay were well-controlled.   Mediterranean Diet Score is 5. Some suggestions include continue incorporating 2 or more servings per day of vegetables, fruits, and whole grains. Increase intake of fish and legumes/beans to 2 or more servings per week. Aim to increase intake of healthy fats, such as olive oil and avocados, and have a handful of nuts/seeds most days. Reduce red/processed meat consumption to 2 or fewer times per week.   Exercise: Recommended gradually increasing activity to 30-45 minutes most days of the week once cleared by referring cardiologist. After his aortic surgery, cardiac rehab might benefit this patient and is covered by major insurance plans (other than co-pays), please refer.   Medication Adherence: Patient has no issues with adherence at this time.   Smoking: This patient is a non-smoker.   Obesity/Overweight: The patient is at a healthy weight currently, and his BMI is WNL at 23.1.  Glucometabolic State: Patient's blood sugar is well-controlled at this time. HbA1c today was 5.3%.   Sleep Apnea: The patient is at low risk for sleep apnea.   Psychological Stress: The patient appears to be coping with stressors well at this time.     Thank you for the opportunity to see this patient. Please feel free to contact Prevention if there are any questions, or if you feel that your patient would benefit from continued follow-up visits with the Program.    Jen Sullivan, [facility]  Cardiovascular Prevention     Brenda Harris MD  System Director, Cardiovascular Prevention

## 2021-07-26 NOTE — PROGRESS NOTE ADULT - SUBJECTIVE AND OBJECTIVE BOX
Interventional Cardiology PA SDA Discharge Note    Patient without complaints. 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 for cardiac catheterization as part of workup for planned valve sparing aortic root replacement w/ Dr Munson scheduled for 08/02/2021. Patient states he is normally quite active w/o chest pain, SOB/EDWARD, palpitations, dizziness, or syncope, but has noticed a worsening cough w/ thick, light yellow sputum for the last 2 months which he attributes to asthma and allergies and recent completed a course of Prednisone and antibiotics. Patient reports that these symptoms have improved at this time. Patient also denies any headache, abdominal pain, nausea/vomiting/diarrhea, melena, LE edema, fever, chills, and known sick contacts. 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 Echocardiogram (11/12/2020) showed dilated aortic root measuring 4.1 cm at the sinuses of Valsalva and EF 55%, no wall motion abnormalities, normal diastolic function, trace MR/TR/MO, and no mention of RV communication (as was there was concern for this on CCTA).  In light of patient's risk factors and plan for aortic root replacement, patient now presents for cardiac catheterization w/ possible intervention if clinically indicated as part of pre-op workup.    s/p cardiac cath (7/26/21): LM normal, LAD minor irregularities, LCx minor irregularities, RCA normal; LVEF 55%, LVEDp 13mmHg. R radial TR band due at 12:45pm.     1.	Stable for discharge as per attending Dr. Varela after bed rest, pt voids, groin/wrist stable and 30 minutes of ambulation.  2.	Follow-up with Cardiologist Dr. Springer in 1-2 weeks  3.	Discharged forms signed and copies in chart

## 2021-07-27 PROBLEM — J45.909 UNSPECIFIED ASTHMA, UNCOMPLICATED: Chronic | Status: ACTIVE | Noted: 2021-07-20

## 2021-07-27 PROBLEM — N40.0 BENIGN PROSTATIC HYPERPLASIA WITHOUT LOWER URINARY TRACT SYMPTOMS: Chronic | Status: ACTIVE | Noted: 2021-07-20

## 2021-07-27 PROBLEM — E78.5 HYPERLIPIDEMIA, UNSPECIFIED: Chronic | Status: ACTIVE | Noted: 2021-07-20

## 2021-07-27 PROBLEM — I10 ESSENTIAL (PRIMARY) HYPERTENSION: Chronic | Status: ACTIVE | Noted: 2021-07-20

## 2021-07-27 PROBLEM — I71.9 AORTIC ANEURYSM OF UNSPECIFIED SITE, WITHOUT RUPTURE: Chronic | Status: ACTIVE | Noted: 2021-07-20

## 2021-07-29 DIAGNOSIS — Z82.49 FAMILY HISTORY OF ISCHEMIC HEART DISEASE AND OTHER DISEASES OF THE CIRCULATORY SYSTEM: ICD-10-CM

## 2021-07-29 DIAGNOSIS — I71.9 AORTIC ANEURYSM OF UNSPECIFIED SITE, WITHOUT RUPTURE: ICD-10-CM

## 2021-07-29 DIAGNOSIS — R93.1 ABNORMAL FINDINGS ON DIAGNOSTIC IMAGING OF HEART AND CORONARY CIRCULATION: ICD-10-CM

## 2021-07-29 DIAGNOSIS — Z95.2 PRESENCE OF PROSTHETIC HEART VALVE: ICD-10-CM

## 2021-07-30 ENCOUNTER — NON-APPOINTMENT (OUTPATIENT)
Age: 61
End: 2021-07-30

## 2021-07-30 RX ORDER — ASPIRIN/CALCIUM CARB/MAGNESIUM 324 MG
1 TABLET ORAL
Qty: 0 | Refills: 0 | DISCHARGE

## 2021-07-30 RX ORDER — MONTELUKAST 4 MG/1
1 TABLET, CHEWABLE ORAL
Qty: 0 | Refills: 0 | DISCHARGE

## 2021-07-30 RX ORDER — AMLODIPINE BESYLATE 2.5 MG/1
1 TABLET ORAL
Qty: 0 | Refills: 0 | DISCHARGE

## 2021-07-30 RX ORDER — FINASTERIDE 5 MG/1
1 TABLET, FILM COATED ORAL
Qty: 0 | Refills: 0 | DISCHARGE

## 2021-07-30 RX ORDER — ATORVASTATIN CALCIUM 80 MG/1
1 TABLET, FILM COATED ORAL
Qty: 0 | Refills: 0 | DISCHARGE

## 2021-07-30 RX ORDER — ALBUTEROL 90 UG/1
2 AEROSOL, METERED ORAL
Qty: 0 | Refills: 0 | DISCHARGE

## 2021-07-30 RX ORDER — BUDESONIDE AND FORMOTEROL FUMARATE DIHYDRATE 160; 4.5 UG/1; UG/1
2 AEROSOL RESPIRATORY (INHALATION)
Qty: 0 | Refills: 0 | DISCHARGE

## 2021-07-30 RX ORDER — METOPROLOL TARTRATE 50 MG
0.5 TABLET ORAL
Qty: 0 | Refills: 0 | DISCHARGE

## 2021-08-02 ENCOUNTER — RX RENEWAL (OUTPATIENT)
Age: 61
End: 2021-08-02

## 2021-08-02 ENCOUNTER — APPOINTMENT (OUTPATIENT)
Dept: CARDIOTHORACIC SURGERY | Facility: HOSPITAL | Age: 61
End: 2021-08-02

## 2021-08-12 ENCOUNTER — APPOINTMENT (OUTPATIENT)
Dept: HEART AND VASCULAR | Facility: CLINIC | Age: 61
End: 2021-08-12
Payer: COMMERCIAL

## 2021-08-12 VITALS
TEMPERATURE: 97.9 F | DIASTOLIC BLOOD PRESSURE: 72 MMHG | WEIGHT: 146 LBS | SYSTOLIC BLOOD PRESSURE: 118 MMHG | HEIGHT: 64 IN | HEART RATE: 74 BPM | BODY MASS INDEX: 24.92 KG/M2

## 2021-08-12 DIAGNOSIS — I48.0 PAROXYSMAL ATRIAL FIBRILLATION: ICD-10-CM

## 2021-08-12 PROCEDURE — 93000 ELECTROCARDIOGRAM COMPLETE: CPT

## 2021-08-12 PROCEDURE — 99214 OFFICE O/P EST MOD 30 MIN: CPT | Mod: 25

## 2021-08-12 RX ORDER — METOPROLOL SUCCINATE 25 MG/1
25 TABLET, EXTENDED RELEASE ORAL
Qty: 135 | Refills: 0 | Status: ACTIVE | COMMUNITY
Start: 2021-06-28 | End: 1900-01-01

## 2021-08-12 RX ORDER — AMLODIPINE BESYLATE 5 MG/1
5 TABLET ORAL DAILY
Qty: 30 | Refills: 0 | Status: DISCONTINUED | COMMUNITY
Start: 2021-07-20 | End: 2021-08-12

## 2021-08-12 RX ORDER — WARFARIN SODIUM 6 MG/1
TABLET ORAL
Refills: 0 | Status: ACTIVE | COMMUNITY

## 2021-08-16 NOTE — DISCUSSION/SUMMARY
[Patient] : the patient [___ Month(s)] : in [unfilled] month(s) [FreeTextEntry1] : 60 y/o male with h/o htn, asthma, family h/o early cvd, cad, dilated ao root, sinus of valsalva/right coronary cusp aneurysm who presents for f/up today s/p repair sinus of valsalva aneurysm with woven dacron patch 8/3/21 course c/b post op afib\par \par -ep referral given post op afib, now on coumadin/amio for 8 weeks\par -CTA 11/20: mild aneurysmal dilatation ao root 4.2 cm,.7 cm focal wide based bulge/aneurysm arising from Right coronary cusp of sinus of valsalva, calcium score 135 (75%), LM <25%, prox LAD mild, mid LAD shallow bridge, \par LCx mild, prox/mid RCA minimal, deep myocardial crypt in mid inferoseptal wall recommend correlation w echo to exclude RV communication, focal aneurysm sinus of valsalva RCC (8mm neck x 4.8 mm length)\par -Echo 11/20: normal lv size/function, ef 55%, no wma, trace mr/tr/pr, ao root dilated 4.10 cm\par -ordered ekg today - nsr, rbbb, no st/t changes\par -labs 2020 reviewed\par -continue asa, statin\par -will repeat echo in 3 months\par -will obtain recent labs, d/c summary\par -htn/aortic anerysm, post op afib - on toprol\par -f/up with pulm for asthma\par -Cath 7/21: LAD<30% mid prox stenosis, Cx <30% stenosis, RCA luminal irreg, lv fxn normal ef 55\par %, mod dilated ao root/mild ai\par -counseled on cvd risk factors\par -f/up 3 months post op afib, cad\par \par I have spent 30 minutes reviewing labs, records, tests and discussing bp control/meds, sinus of valsalva aneurysm/dilated ao root repair, post op afib\par

## 2021-08-16 NOTE — HISTORY OF PRESENT ILLNESS
[FreeTextEntry1] : 62 y/o male with h/o htn, asthma, family h/o early cvd, cad, dilated ao root, sinus of valsalva/right coronary cusp aneurysm who presents for f/up today s/p repair sinus of valsalva aneurysm with woven dacron patch 8/3/21\par c/b post op afib\par \par last seen \par \par now s/p repair sinus of valsalva aneurysm with woven dacron patch 8/3/21 at [facility] with Dr. Ledezma\par \par noted post op afib and started amio and coumadin for 8 weeks\par norvasc dc'd and toprol dose increased \par \par no cp, sob, syncope, lh, edema, orthopnea, pnd, palpitations\par notes some left finger numbness/arm pain where prior IV was - states pcp did u/s and no concerns noted of LUE\par \par \par \par Had Cath at [facility]\par Cath : LAD<30% mid prox stenosis, Cx <30% stenosis, RCA luminal irreg, lv fxn normal ef 55\par %, mod dilated ao root/mild ai\par \par \par on asa, lipitor, ccb, bb \par uses viagra\par \par \par -CTA : mild aneurysmal dilatation ao root 4.2 cm, .7 cm focal wide based bulge/aneurysm arising from Right coronary cusp of sinus of valsalva, calcium score 135 (75%), LM <25%, prox LAD mild, mid LAD shallow bridge, \par LCx mild, prox/mid RCA minimal, deep myocardial crypt in mid inferoseptal wall recommend correlation w echo to exclude RV communication, focal aneurysm sinus of valsalva RCC (8mm neck x 4.8 mm length)\par -Echo : normal lv size/function, ef 55%, no wma, trace mr/tr/pr, ao root dilated 4.10 cm\par \par \par \par \par Has pulmonologist - Dr. Grover\par \par \par runs/walks 4-5 miles several times/week\par eating healthy\par stress level high\par \par Last cardiologist - Dr. Carlton Deleon - cardiologist/pcp for 10 yrs, last seen 2 years ago\par last imaging/holter/stress 5 yrs ago\par \par \par HTN diagnosed in 40's\par had been on losartan in past - now on amlodipine and hctz \par \par had abnormal ekg since 30's\par \par PMH/PSH:\par s/p repair sinus of valsalva aneurysm with woven dacron patch 8/3/21\par cad\par htn\par allergic rhinitis\par anal fissure/fistula\par hemorrhoids\par s/p hernia repair\par s/p anal fissurectomy/fistulotomy \par asthma\par dilated ao root\par sinus of valsalva/right coronary cusp aneurysm\par post op afib\par \par SH:\par \par live alone\par has girlfriend\par no tobacco/drugs\par social etoh\par 3 children\par \par from [place]\par \par \par MEDS:\par asa 81 mg qd\par lipitor 10 mg qd\par singulair 10 mg qd\par toprol 37.5 mg qd \par venotlin prn\par budesonide/formoterol 2 puffs bid\par finasteride 5 mg qd\par allegra 180 mg prn\par viagra prn\par coumadin 3 mg qd\par amio 200 mg qd\par neurontin tib\par oxycodone 10 mg prn\par miralax/senna/protonix\par \par \par ALL:\par avelox\par pcn\par \par \par FH:\par father -  78, cvd/lung disease, ppm, MI/PCI - 40's\par mother - cabg 60's, alive 80\par brother - alive, 55, htn\par sister - alive, 62, htn\par 3 children - healthy\par \par \par

## 2021-08-19 ENCOUNTER — APPOINTMENT (OUTPATIENT)
Dept: HEART AND VASCULAR | Facility: CLINIC | Age: 61
End: 2021-08-19
Payer: COMMERCIAL

## 2021-08-19 VITALS
SYSTOLIC BLOOD PRESSURE: 126 MMHG | HEIGHT: 64 IN | BODY MASS INDEX: 26.12 KG/M2 | DIASTOLIC BLOOD PRESSURE: 78 MMHG | HEART RATE: 72 BPM | WEIGHT: 153 LBS

## 2021-08-19 DIAGNOSIS — D64.9 ANEMIA, UNSPECIFIED: ICD-10-CM

## 2021-08-19 PROCEDURE — 99214 OFFICE O/P EST MOD 30 MIN: CPT | Mod: 25

## 2021-08-19 PROCEDURE — 93306 TTE W/DOPPLER COMPLETE: CPT

## 2021-08-19 RX ORDER — AMIODARONE HYDROCHLORIDE 200 MG/1
200 TABLET ORAL
Refills: 0 | Status: DISCONTINUED | COMMUNITY
End: 2021-08-19

## 2021-08-20 ENCOUNTER — APPOINTMENT (OUTPATIENT)
Dept: HEART AND VASCULAR | Facility: CLINIC | Age: 61
End: 2021-08-20
Payer: COMMERCIAL

## 2021-08-20 VITALS — SYSTOLIC BLOOD PRESSURE: 132 MMHG | HEART RATE: 66 BPM | DIASTOLIC BLOOD PRESSURE: 76 MMHG

## 2021-08-20 PROCEDURE — 93306 TTE W/DOPPLER COMPLETE: CPT

## 2021-08-23 LAB
ALBUMIN SERPL ELPH-MCNC: 4.1 G/DL
ALP BLD-CCNC: 178 U/L
ALT SERPL-CCNC: 64 U/L
ANION GAP SERPL CALC-SCNC: 12 MMOL/L
AST SERPL-CCNC: 27 U/L
BASOPHILS # BLD AUTO: 0.05 K/UL
BASOPHILS NFR BLD AUTO: 0.6 %
BILIRUB SERPL-MCNC: 0.3 MG/DL
BUN SERPL-MCNC: 15 MG/DL
CALCIUM SERPL-MCNC: 9.1 MG/DL
CHLORIDE SERPL-SCNC: 104 MMOL/L
CHOLEST SERPL-MCNC: 71 MG/DL
CO2 SERPL-SCNC: 26 MMOL/L
CREAT SERPL-MCNC: 0.95 MG/DL
EOSINOPHIL # BLD AUTO: 0.21 K/UL
EOSINOPHIL NFR BLD AUTO: 2.5 %
ESTIMATED AVERAGE GLUCOSE: 103 MG/DL
GLUCOSE SERPL-MCNC: 86 MG/DL
HBA1C MFR BLD HPLC: 5.2 %
HCT VFR BLD CALC: 31.8 %
HDLC SERPL-MCNC: 39 MG/DL
HGB BLD-MCNC: 9.2 G/DL
IMM GRANULOCYTES NFR BLD AUTO: 0.5 %
INR PPP: 3.98 RATIO
IRON SATN MFR SERPL: 9 %
IRON SERPL-MCNC: 21 UG/DL
LDLC SERPL CALC-MCNC: 24 MG/DL
LYMPHOCYTES # BLD AUTO: 1.03 K/UL
LYMPHOCYTES NFR BLD AUTO: 12.4 %
MAGNESIUM SERPL-MCNC: 2 MG/DL
MAN DIFF?: NORMAL
MCHC RBC-ENTMCNC: 28.9 GM/DL
MCHC RBC-ENTMCNC: 30 PG
MCV RBC AUTO: 103.6 FL
MONOCYTES # BLD AUTO: 0.77 K/UL
MONOCYTES NFR BLD AUTO: 9.3 %
NEUTROPHILS # BLD AUTO: 6.21 K/UL
NEUTROPHILS NFR BLD AUTO: 74.7 %
NONHDLC SERPL-MCNC: 32 MG/DL
PLATELET # BLD AUTO: 607 K/UL
POTASSIUM SERPL-SCNC: 4.6 MMOL/L
PROT SERPL-MCNC: 6.2 G/DL
PT BLD: 44.3 SEC
RBC # BLD: 3.07 M/UL
RBC # FLD: 13.8 %
SODIUM SERPL-SCNC: 142 MMOL/L
TIBC SERPL-MCNC: 234 UG/DL
TRIGL SERPL-MCNC: 43 MG/DL
TSH SERPL-ACNC: 2.33 UIU/ML
UIBC SERPL-MCNC: 213 UG/DL
WBC # FLD AUTO: 8.31 K/UL

## 2021-08-23 NOTE — DISCUSSION/SUMMARY
[Patient] : the patient [___ Month(s)] : in [unfilled] month(s) [FreeTextEntry1] : 62 y/o male with h/o htn, asthma, family h/o early cvd, cad, dilated ao root, sinus of valsalva/right coronary cusp aneurysm who presents for f/up today s/p repair sinus of valsalva aneurysm with woven dacron patch 8/3/21 course c/b post op afib\par \par -ep referral given post op afib, now on coumadin, will  have ILR placed\par -will dc amio today\par -CTA 11/20: mild aneurysmal dilatation ao root 4.2 cm,.7 cm focal wide based bulge/aneurysm arising from Right coronary cusp of sinus of valsalva, calcium score 135 (75%), LM <25%, prox LAD mild, mid LAD shallow bridge, \par LCx mild, prox/mid RCA minimal, deep myocardial crypt in mid inferoseptal wall recommend correlation w echo to exclude RV communication, focal aneurysm sinus of valsalva RCC (8mm neck x 4.8 mm length)\par -Echo 11/20: normal lv size/function, ef 55%, no wma, trace mr/tr/pr, ao root dilated 4.10 cm\par -ekg 8/17/21 reviewed - nsr, normal intervals, nsra\par -ekg 8/21 - nsr, rbbb, no st/t changes\par -labs 2020 reviewed\par -continue statin\par -discuss dc asa with Dr. Ledezma if Ok with him\par -will repeat echo now\par -recent anemia, will order repeat labs today\par -htn/aortic anerysm, post op afib - on toprol\par -f/up with pulm for asthma\par -Cath 7/21: LAD<30% mid prox stenosis, Cx <30% stenosis, RCA luminal irreg, lv fxn normal ef 55\par %, mod dilated ao root/mild ai\par -counseled on cvd risk factors\par -f/up 3 months post op afib, cad\par \par I have spent 30 minutes reviewing labs, records, tests and discussing bp control/meds, sinus of valsalva aneurysm/dilated ao root repair, post op afib\par

## 2021-08-23 NOTE — HISTORY OF PRESENT ILLNESS
[FreeTextEntry1] : 62 y/o male with h/o htn, asthma, family h/o early cvd, cad, dilated ao root, sinus of valsalva/right coronary cusp aneurysm who presents for f/up today s/p repair sinus of valsalva aneurysm with woven dacron patch 8/3/21\par c/b post op afib\par \par last seen \par reports an episode of LH last week\par advised to have labs drawn, bp check, ekg  - nsr, normal intervals, nsra\par had hgb 9 at d/c from hospital\par \par \par now s/p repair sinus of valsalva aneurysm with woven dacron patch 8/3/21 at [facility] with Dr. Ledezma\par \par noted post op afib and started amio and coumadin for 8 weeks\par norvasc dc'd and toprol dose increased \par \par no cp, sob, syncope, lh, edema, orthopnea, pnd, palpitations\par notes some left finger numbness/arm pain where prior IV was - states pcp did u/s and no concerns noted of LUE\par \par \par \par Had Cath at [facility]\par Cath : LAD<30% mid prox stenosis, Cx <30% stenosis, RCA luminal irreg, lv fxn normal ef 55\par %, mod dilated ao root/mild ai\par \par \par on asa, lipitor, ccb, bb \par uses viagra\par \par \par -CTA : mild aneurysmal dilatation ao root 4.2 cm, .7 cm focal wide based bulge/aneurysm arising from Right coronary cusp of sinus of valsalva, calcium score 135 (75%), LM <25%, prox LAD mild, mid LAD shallow bridge, \par LCx mild, prox/mid RCA minimal, deep myocardial crypt in mid inferoseptal wall recommend correlation w echo to exclude RV communication, focal aneurysm sinus of valsalva RCC (8mm neck x 4.8 mm length)\par -Echo : normal lv size/function, ef 55%, no wma, trace mr/tr/pr, ao root dilated 4.10 cm\par \par \par \par \par Has pulmonologist - Dr. Grover\par \par \par runs/walks 4-5 miles several times/week\par eating healthy\par stress level high\par \par Last cardiologist - Dr. Carlton Deleon - cardiologist/pcp for 10 yrs, last seen 2 years ago\par last imaging/holter/stress 5 yrs ago\par \par \par HTN diagnosed in 40's\par had been on losartan in past - now on amlodipine and hctz \par \par had abnormal ekg since 30's\par \par PMH/PSH:\par s/p repair sinus of valsalva aneurysm with woven dacron patch 8/3/21\par cad\par htn\par allergic rhinitis\par anal fissure/fistula\par hemorrhoids\par s/p hernia repair\par s/p anal fissurectomy/fistulotomy \par asthma\par dilated ao root\par sinus of valsalva/right coronary cusp aneurysm\par post op afib\par \par SH:\par \par live alone\par has girlfriend\par no tobacco/drugs\par social etoh\par 3 children\par \par from [place]\par \par \par MEDS:\par asa 81 mg qd\par lipitor 10 mg qd\par singulair 10 mg qd\par toprol 37.5 mg qd \par venotlin prn\par budesonide/formoterol 2 puffs bid\par finasteride 5 mg qd\par allegra 180 mg prn\par viagra prn\par coumadin 3 mg qd\par amio 200 mg qd\par neurontin tib\par oxycodone 10 mg prn\par miralax/senna/protonix\par \par \par ALL:\par avelox\par pcn\par \par \par FH:\par father -  78, cvd/lung disease, ppm, MI/PCI - 40's\par mother - cabg 60's, alive 80\par brother - alive, 55, htn\par sister - alive, 62, htn\par 3 children - healthy\par

## 2021-08-23 NOTE — ADDENDUM
[FreeTextEntry1] : Echo: 8/21 - large loculated pericardial effusion, normal ef - will refer to ER for evaluation possible drainage

## 2021-08-30 ENCOUNTER — APPOINTMENT (OUTPATIENT)
Dept: HEART AND VASCULAR | Facility: CLINIC | Age: 61
End: 2021-08-30

## 2021-11-15 ENCOUNTER — APPOINTMENT (OUTPATIENT)
Dept: HEART AND VASCULAR | Facility: CLINIC | Age: 61
End: 2021-11-15

## 2022-03-22 ENCOUNTER — RX RENEWAL (OUTPATIENT)
Age: 62
End: 2022-03-22

## 2022-03-22 RX ORDER — ATORVASTATIN CALCIUM 10 MG/1
10 TABLET, FILM COATED ORAL
Qty: 30 | Refills: 6 | Status: ACTIVE | COMMUNITY
Start: 2020-11-06 | End: 1900-01-01

## 2023-04-12 NOTE — H&P ADULT - BP NONINVASIVE SYSTOLIC (MM HG)
Pt c/o constipation, spoke with Ashish Willett NP for Dr. Frank and informed of patient complaints. New orders for Colace 100mg once daily and Miralax once daily. Pt informed of new orders.    142

## 2023-10-05 NOTE — H&P ADULT - WEIGHT IN LBS
----- Message from Rosalinda Montemayor MD sent at 10/5/2023  8:09 AM CDT -----  Please inform pt  Normal thyroid  Normal sodium potassium kidney and liver  Normal white blood cells and hemoglobin no anemia  Red blood cells slightly abnormal will recheck at following visit  Hep c negative  Sugar/A1c is 5.7 - this is prediabetes range (at risk for diabetes) you do not need medicaiton at this time however recommend monitoring the sugar and carbs.   Cholesterol is high at 247 normal < 200 and Ldl bad cholesterol is high at 147 normal < 129  Your ascvd 10 yr risk is 14.6% this indicates you are at higher risk for cardiovascular disease (heart attack and stroke) and recommend cholesterol medication to decrease your risk   Please let me know if you are intersted and can send prescription or can also schedule a follow up to further discuss this  Also recommend .,recommend limiting the fried foods, fast foods oils butters, cheese, red meats, and recommend a  heart healthy diet rich in whole grains and vegetables          143